# Patient Record
Sex: MALE | Race: WHITE | NOT HISPANIC OR LATINO | URBAN - METROPOLITAN AREA
[De-identification: names, ages, dates, MRNs, and addresses within clinical notes are randomized per-mention and may not be internally consistent; named-entity substitution may affect disease eponyms.]

---

## 2023-04-07 DIAGNOSIS — E03.9 HYPOTHYROIDISM, UNSPECIFIED TYPE: ICD-10-CM

## 2023-04-18 PROBLEM — E04.9 ENLARGEMENT OF THYROID: Status: ACTIVE | Noted: 2023-04-18

## 2023-04-18 PROBLEM — K21.9 GERD WITHOUT ESOPHAGITIS: Status: ACTIVE | Noted: 2023-04-18

## 2023-04-18 PROBLEM — I10 HYPERTENSION: Status: ACTIVE | Noted: 2023-04-18

## 2023-04-18 PROBLEM — E03.9 HYPOTHYROID: Status: ACTIVE | Noted: 2023-04-18

## 2023-04-18 RX ORDER — VALSARTAN AND HYDROCHLOROTHIAZIDE 320; 25 MG/1; MG/1
1 TABLET, FILM COATED ORAL DAILY
COMMUNITY
Start: 2017-09-19 | End: 2023-07-18 | Stop reason: SDUPTHER

## 2023-04-18 RX ORDER — LEVOTHYROXINE SODIUM 150 UG/1
150 TABLET ORAL
COMMUNITY
Start: 2021-03-02 | End: 2023-04-18 | Stop reason: SDUPTHER

## 2023-04-18 RX ORDER — PANTOPRAZOLE SODIUM 40 MG/1
1 TABLET, DELAYED RELEASE ORAL DAILY
COMMUNITY
Start: 2020-10-13 | End: 2023-07-18 | Stop reason: SDUPTHER

## 2023-04-18 RX ORDER — LEVOTHYROXINE SODIUM 150 UG/1
150 TABLET ORAL
Qty: 90 TABLET | Refills: 1 | Status: SHIPPED | OUTPATIENT
Start: 2023-04-18 | End: 2024-02-22 | Stop reason: SDUPTHER

## 2023-04-18 RX ORDER — AMLODIPINE BESYLATE 10 MG/1
1 TABLET ORAL DAILY
COMMUNITY
Start: 2017-10-04 | End: 2023-07-18 | Stop reason: SDUPTHER

## 2023-07-18 ENCOUNTER — OFFICE VISIT (OUTPATIENT)
Dept: PRIMARY CARE | Facility: CLINIC | Age: 53
End: 2023-07-18
Payer: COMMERCIAL

## 2023-07-18 ENCOUNTER — LAB (OUTPATIENT)
Dept: LAB | Facility: LAB | Age: 53
End: 2023-07-18
Payer: COMMERCIAL

## 2023-07-18 VITALS
HEART RATE: 75 BPM | OXYGEN SATURATION: 98 % | SYSTOLIC BLOOD PRESSURE: 120 MMHG | WEIGHT: 264 LBS | DIASTOLIC BLOOD PRESSURE: 82 MMHG | HEIGHT: 71 IN | BODY MASS INDEX: 36.96 KG/M2

## 2023-07-18 DIAGNOSIS — M79.671 PAIN IN BOTH FEET: ICD-10-CM

## 2023-07-18 DIAGNOSIS — Z12.5 SCREENING PSA (PROSTATE SPECIFIC ANTIGEN): ICD-10-CM

## 2023-07-18 DIAGNOSIS — M79.672 PAIN IN BOTH FEET: ICD-10-CM

## 2023-07-18 DIAGNOSIS — Z00.00 ANNUAL PHYSICAL EXAM: Primary | ICD-10-CM

## 2023-07-18 DIAGNOSIS — E55.9 VITAMIN D DEFICIENCY: ICD-10-CM

## 2023-07-18 DIAGNOSIS — I10 PRIMARY HYPERTENSION: ICD-10-CM

## 2023-07-18 DIAGNOSIS — E66.01 CLASS 2 SEVERE OBESITY DUE TO EXCESS CALORIES WITH SERIOUS COMORBIDITY AND BODY MASS INDEX (BMI) OF 36.0 TO 36.9 IN ADULT (MULTI): ICD-10-CM

## 2023-07-18 DIAGNOSIS — K21.9 GERD WITHOUT ESOPHAGITIS: ICD-10-CM

## 2023-07-18 DIAGNOSIS — E03.9 HYPOTHYROIDISM, UNSPECIFIED TYPE: ICD-10-CM

## 2023-07-18 DIAGNOSIS — Z00.00 ANNUAL PHYSICAL EXAM: ICD-10-CM

## 2023-07-18 LAB
ALANINE AMINOTRANSFERASE (SGPT) (U/L) IN SER/PLAS: 19 U/L (ref 10–52)
ALBUMIN (G/DL) IN SER/PLAS: 4.2 G/DL (ref 3.4–5)
ALKALINE PHOSPHATASE (U/L) IN SER/PLAS: 51 U/L (ref 33–120)
ANION GAP IN SER/PLAS: 12 MMOL/L (ref 10–20)
ASPARTATE AMINOTRANSFERASE (SGOT) (U/L) IN SER/PLAS: 21 U/L (ref 9–39)
BILIRUBIN TOTAL (MG/DL) IN SER/PLAS: 0.4 MG/DL (ref 0–1.2)
CALCIDIOL (25 OH VITAMIN D3) (NG/ML) IN SER/PLAS: 58 NG/ML
CALCIUM (MG/DL) IN SER/PLAS: 9.3 MG/DL (ref 8.6–10.6)
CARBON DIOXIDE, TOTAL (MMOL/L) IN SER/PLAS: 29 MMOL/L (ref 21–32)
CHLORIDE (MMOL/L) IN SER/PLAS: 102 MMOL/L (ref 98–107)
CHOLESTEROL (MG/DL) IN SER/PLAS: 152 MG/DL (ref 0–199)
CHOLESTEROL IN HDL (MG/DL) IN SER/PLAS: 50 MG/DL
CHOLESTEROL/HDL RATIO: 3
CREATININE (MG/DL) IN SER/PLAS: 1.04 MG/DL (ref 0.5–1.3)
ERYTHROCYTE DISTRIBUTION WIDTH (RATIO) BY AUTOMATED COUNT: 12.5 % (ref 11.5–14.5)
ERYTHROCYTE MEAN CORPUSCULAR HEMOGLOBIN CONCENTRATION (G/DL) BY AUTOMATED: 33.3 G/DL (ref 32–36)
ERYTHROCYTE MEAN CORPUSCULAR VOLUME (FL) BY AUTOMATED COUNT: 87 FL (ref 80–100)
ERYTHROCYTES (10*6/UL) IN BLOOD BY AUTOMATED COUNT: 4.84 X10E12/L (ref 4.5–5.9)
GFR MALE: 85 ML/MIN/1.73M2
GLUCOSE (MG/DL) IN SER/PLAS: 89 MG/DL (ref 74–99)
HEMATOCRIT (%) IN BLOOD BY AUTOMATED COUNT: 42.3 % (ref 41–52)
HEMOGLOBIN (G/DL) IN BLOOD: 14.1 G/DL (ref 13.5–17.5)
LDL: 80 MG/DL (ref 0–99)
LEUKOCYTES (10*3/UL) IN BLOOD BY AUTOMATED COUNT: 6.2 X10E9/L (ref 4.4–11.3)
MAGNESIUM (MG/DL) IN SER/PLAS: 2.16 MG/DL (ref 1.6–2.4)
NRBC (PER 100 WBCS) BY AUTOMATED COUNT: 0 /100 WBC (ref 0–0)
PLATELETS (10*3/UL) IN BLOOD AUTOMATED COUNT: 290 X10E9/L (ref 150–450)
POTASSIUM (MMOL/L) IN SER/PLAS: 4.3 MMOL/L (ref 3.5–5.3)
PROSTATE SPECIFIC ANTIGEN,SCREEN: 0.66 NG/ML (ref 0–4)
PROTEIN TOTAL: 7 G/DL (ref 6.4–8.2)
SODIUM (MMOL/L) IN SER/PLAS: 139 MMOL/L (ref 136–145)
THYROTROPIN (MIU/L) IN SER/PLAS BY DETECTION LIMIT <= 0.05 MIU/L: 2.24 MIU/L (ref 0.44–3.98)
TRIGLYCERIDE (MG/DL) IN SER/PLAS: 109 MG/DL (ref 0–149)
UREA NITROGEN (MG/DL) IN SER/PLAS: 20 MG/DL (ref 6–23)
VLDL: 22 MG/DL (ref 0–40)

## 2023-07-18 PROCEDURE — 99396 PREV VISIT EST AGE 40-64: CPT | Performed by: FAMILY MEDICINE

## 2023-07-18 PROCEDURE — 84443 ASSAY THYROID STIM HORMONE: CPT

## 2023-07-18 PROCEDURE — 3074F SYST BP LT 130 MM HG: CPT | Performed by: FAMILY MEDICINE

## 2023-07-18 PROCEDURE — 85027 COMPLETE CBC AUTOMATED: CPT

## 2023-07-18 PROCEDURE — 80061 LIPID PANEL: CPT

## 2023-07-18 PROCEDURE — 82306 VITAMIN D 25 HYDROXY: CPT

## 2023-07-18 PROCEDURE — 36415 COLL VENOUS BLD VENIPUNCTURE: CPT

## 2023-07-18 PROCEDURE — 3079F DIAST BP 80-89 MM HG: CPT | Performed by: FAMILY MEDICINE

## 2023-07-18 PROCEDURE — 84153 ASSAY OF PSA TOTAL: CPT

## 2023-07-18 PROCEDURE — 80053 COMPREHEN METABOLIC PANEL: CPT

## 2023-07-18 PROCEDURE — 1036F TOBACCO NON-USER: CPT | Performed by: FAMILY MEDICINE

## 2023-07-18 PROCEDURE — 3008F BODY MASS INDEX DOCD: CPT | Performed by: FAMILY MEDICINE

## 2023-07-18 PROCEDURE — 83735 ASSAY OF MAGNESIUM: CPT

## 2023-07-18 PROCEDURE — 99214 OFFICE O/P EST MOD 30 MIN: CPT | Performed by: FAMILY MEDICINE

## 2023-07-18 RX ORDER — LEVOTHYROXINE SODIUM 150 UG/1
150 TABLET ORAL DAILY
Qty: 90 TABLET | Refills: 3 | Status: SHIPPED | OUTPATIENT
Start: 2023-07-18 | End: 2024-02-22

## 2023-07-18 RX ORDER — MELOXICAM 15 MG/1
15 TABLET ORAL DAILY
Qty: 30 TABLET | Refills: 11 | Status: SHIPPED | OUTPATIENT
Start: 2023-07-18 | End: 2024-02-22 | Stop reason: ALTCHOICE

## 2023-07-18 RX ORDER — AMLODIPINE BESYLATE 10 MG/1
10 TABLET ORAL DAILY
Qty: 90 TABLET | Refills: 1 | Status: SHIPPED | OUTPATIENT
Start: 2023-07-18 | End: 2024-02-22 | Stop reason: SDUPTHER

## 2023-07-18 RX ORDER — PANTOPRAZOLE SODIUM 40 MG/1
40 TABLET, DELAYED RELEASE ORAL DAILY
Qty: 90 TABLET | Refills: 3 | Status: SHIPPED | OUTPATIENT
Start: 2023-07-18

## 2023-07-18 RX ORDER — VALSARTAN AND HYDROCHLOROTHIAZIDE 320; 25 MG/1; MG/1
1 TABLET, FILM COATED ORAL DAILY
Qty: 90 TABLET | Refills: 1 | Status: SHIPPED | OUTPATIENT
Start: 2023-07-18 | End: 2024-02-22 | Stop reason: SDUPTHER

## 2023-07-18 ASSESSMENT — PATIENT HEALTH QUESTIONNAIRE - PHQ9
SUM OF ALL RESPONSES TO PHQ QUESTIONS 1-9: 0
SUM OF ALL RESPONSES TO PHQ9 QUESTIONS 1 AND 2: 0
6. FEELING BAD ABOUT YOURSELF - OR THAT YOU ARE A FAILURE OR HAVE LET YOURSELF OR YOUR FAMILY DOWN: NOT AT ALL
8. MOVING OR SPEAKING SO SLOWLY THAT OTHER PEOPLE COULD HAVE NOTICED. OR THE OPPOSITE, BEING SO FIGETY OR RESTLESS THAT YOU HAVE BEEN MOVING AROUND A LOT MORE THAN USUAL: NOT AT ALL
7. TROUBLE CONCENTRATING ON THINGS, SUCH AS READING THE NEWSPAPER OR WATCHING TELEVISION: NOT AT ALL
1. LITTLE INTEREST OR PLEASURE IN DOING THINGS: NOT AT ALL
9. THOUGHTS THAT YOU WOULD BE BETTER OFF DEAD, OR OF HURTING YOURSELF: NOT AT ALL
10. IF YOU CHECKED OFF ANY PROBLEMS, HOW DIFFICULT HAVE THESE PROBLEMS MADE IT FOR YOU TO DO YOUR WORK, TAKE CARE OF THINGS AT HOME, OR GET ALONG WITH OTHER PEOPLE: NOT DIFFICULT AT ALL
4. FEELING TIRED OR HAVING LITTLE ENERGY: NOT AT ALL
3. TROUBLE FALLING OR STAYING ASLEEP OR SLEEPING TOO MUCH: NOT AT ALL
5. POOR APPETITE OR OVEREATING: NOT AT ALL
2. FEELING DOWN, DEPRESSED OR HOPELESS: NOT AT ALL
SUM OF ALL RESPONSES TO PHQ9 QUESTIONS 1 AND 2: 0
1. LITTLE INTEREST OR PLEASURE IN DOING THINGS: NOT AT ALL
2. FEELING DOWN, DEPRESSED OR HOPELESS: NOT AT ALL

## 2023-07-18 ASSESSMENT — PROMIS GLOBAL HEALTH SCALE
EMOTIONAL_PROBLEMS: NEVER
RATE_SOCIAL_SATISFACTION: VERY GOOD
RATE_QUALITY_OF_LIFE: GOOD
CARRYOUT_PHYSICAL_ACTIVITIES: COMPLETELY
RATE_GENERAL_HEALTH: GOOD
RATE_AVERAGE_PAIN: 1
RATE_PHYSICAL_HEALTH: GOOD
RATE_MENTAL_HEALTH: VERY GOOD
RATE_AVERAGE_FATIGUE: MILD
CARRYOUT_SOCIAL_ACTIVITIES: EXCELLENT

## 2023-07-18 ASSESSMENT — ANXIETY QUESTIONNAIRES
1. FEELING NERVOUS, ANXIOUS, OR ON EDGE: NOT AT ALL
7. FEELING AFRAID AS IF SOMETHING AWFUL MIGHT HAPPEN: NOT AT ALL
2. NOT BEING ABLE TO STOP OR CONTROL WORRYING: NOT AT ALL
5. BEING SO RESTLESS THAT IT IS HARD TO SIT STILL: NOT AT ALL
4. TROUBLE RELAXING: NOT AT ALL
IF YOU CHECKED OFF ANY PROBLEMS ON THIS QUESTIONNAIRE, HOW DIFFICULT HAVE THESE PROBLEMS MADE IT FOR YOU TO DO YOUR WORK, TAKE CARE OF THINGS AT HOME, OR GET ALONG WITH OTHER PEOPLE: NOT DIFFICULT AT ALL
6. BECOMING EASILY ANNOYED OR IRRITABLE: NOT AT ALL
GAD7 TOTAL SCORE: 0
3. WORRYING TOO MUCH ABOUT DIFFERENT THINGS: NOT AT ALL

## 2023-07-18 NOTE — PROGRESS NOTES
"Subjective   Levi Phillips is a 53 y.o. male and is here for a comprehensive physical exam with follow up on the following concerns:    HTN: BP controlled on Amlodipine and valsartan/hydrochlorothiazide  Denies CP, SOB, Edema, palpitations or headache.     HYPOTHYROID: feeling well on current dosage - did have switch to generic by pharmacy, no recheck on labs yet    FOOT PAIN: Dr. Sean Vasquez, podiatry (mentor)  No change in activity ro trauma that might account for new pain  Changed shoes which does help  Had insoles and NSAIDs in the past    Do you take any herbs or supplements that were not prescribed by a doctor? yes vitamin d  Are you taking calcium supplements? no  Are you taking aspirin daily? 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    ELIMINATION: no constipation or diarrhea    No urinary issues    SLEEP: no issues    MOOD: no concerning symptoms  Patient Health Questionnaire-9 Score: 0     LEYLA-7 Total Score: 0        Review of Systems   Review of Systems negative except as noted in HPI and Chief complaint.     Objective     /82 (BP Location: Left arm, Patient Position: Sitting, BP Cuff Size: Adult)   Pulse 75   Ht 1.803 m (5' 11\")   Wt 120 kg (264 lb)   SpO2 98%   BMI 36.82 kg/m²      Physical Exam    Assessment/Plan     Healthy male exam.      1. Please have labs drawn at your earliest convenience.  You should fast 12 hours prior to arriving at the lab - during these 12 hours you may have water, black coffee, black tea - nothing with cream or sugar and no solid foods.    Our office will contact you with results after they have been reviewed.  Please allow 48 - " 72 hours for most results, some may take longer.  Please keep in mind that results may post immediately to your online portal, even before we have a chance to review them.  Once we have had the opportunity to review we will post comments and have our staff contact you with results and any instructions.  Please call our office if you do not hear from our office in 7 days.     2. Patient Counseling:  --Nutrition: Stressed importance of moderation in sodium/caffeine intake, saturated fat and cholesterol, caloric balance, sufficient intake of fresh fruits, vegetables, fiber, calcium, iron, and 1 mg of folate supplement per day (for females capable of pregnancy).  --Discussed the issue of estrogen replacement, calcium supplement, and the daily use of baby aspirin.  --Exercise: Stressed the importance of regular exercise.   --Substance Abuse: Discussed cessation/primary prevention of tobacco, alcohol, or other drug use; driving or other dangerous activities under the influence; availability of treatment for abuse.    --Sexuality: Discussed sexually transmitted diseases, partner selection, use of condoms, avoidance of unintended pregnancy  and contraceptive alternatives.   --Injury prevention: Discussed safety belts, safety helmets, smoke detector, smoking near bedding or upholstery.   --Dental health: Discussed importance of regular tooth brushing, flossing, and dental visits.  --Immunizations reviewed.  --Discussed benefits of screening colonoscopy.  --After hours service discussed with patient  3. Discussed the patient's BMI with him.  The BMI is above average. The patient received Provided instructions on dietary changes  Advised to Increase physical activity because they have an above normal BMI.    4. Follow up in 6 months    Problem List Items Addressed This Visit       GERD without esophagitis    Relevant Medications    pantoprazole (ProtoNix) 40 mg EC tablet    Other Relevant Orders    Magnesium    Hypertension     Relevant Medications    amLODIPine (Norvasc) 10 mg tablet    valsartan-hydrochlorothiazide (Diovan-HCT) 320-25 mg tablet    Other Relevant Orders    Vitamin D, Total    Hypothyroid    Relevant Medications    Synthroid 150 mcg tablet    Annual physical exam - Primary    Relevant Orders    CBC    Comprehensive Metabolic Panel    Lipid Panel    TSH with reflex to Free T4 if abnormal     Other Visit Diagnoses       Screening PSA (prostate specific antigen)        Relevant Orders    Prostate Specific Antigen, Screen    Vitamin D deficiency        Pain in both feet        Trial of Meloxicam, ICE, rest if possible  Consider recheck with podiatry for custom orthotics vs.  xrays if needed.  Consider trial of Prednisone taper.    Relevant Medications    meloxicam (Mobic) 15 mg tablet    Class 2 severe obesity due to excess calories with serious comorbidity and body mass index (BMI) of 36.0 to 36.9 in adult (CMS/[facility])        Encouraging increased acitivty - goal is 150 minutes of CV activity per week.         FOLLOW UP 6 MONTHS FOR BP CHECK PENDING REVIEW OF LABS.

## 2024-02-22 ENCOUNTER — OFFICE VISIT (OUTPATIENT)
Dept: PRIMARY CARE | Facility: CLINIC | Age: 54
End: 2024-02-22
Payer: COMMERCIAL

## 2024-02-22 VITALS
SYSTOLIC BLOOD PRESSURE: 144 MMHG | WEIGHT: 275 LBS | OXYGEN SATURATION: 94 % | DIASTOLIC BLOOD PRESSURE: 80 MMHG | HEART RATE: 66 BPM | BODY MASS INDEX: 38.35 KG/M2

## 2024-02-22 DIAGNOSIS — I10 PRIMARY HYPERTENSION: Primary | ICD-10-CM

## 2024-02-22 DIAGNOSIS — Z12.5 SCREENING PSA (PROSTATE SPECIFIC ANTIGEN): ICD-10-CM

## 2024-02-22 DIAGNOSIS — Z00.00 ANNUAL PHYSICAL EXAM: ICD-10-CM

## 2024-02-22 DIAGNOSIS — E03.9 HYPOTHYROIDISM, UNSPECIFIED TYPE: ICD-10-CM

## 2024-02-22 PROCEDURE — 99213 OFFICE O/P EST LOW 20 MIN: CPT | Performed by: FAMILY MEDICINE

## 2024-02-22 PROCEDURE — 3077F SYST BP >= 140 MM HG: CPT | Performed by: FAMILY MEDICINE

## 2024-02-22 PROCEDURE — 3079F DIAST BP 80-89 MM HG: CPT | Performed by: FAMILY MEDICINE

## 2024-02-22 PROCEDURE — 1036F TOBACCO NON-USER: CPT | Performed by: FAMILY MEDICINE

## 2024-02-22 PROCEDURE — 3008F BODY MASS INDEX DOCD: CPT | Performed by: FAMILY MEDICINE

## 2024-02-22 RX ORDER — LEVOTHYROXINE SODIUM 150 UG/1
150 TABLET ORAL
Qty: 100 TABLET | Refills: 1 | Status: SHIPPED | OUTPATIENT
Start: 2024-02-22 | End: 2024-03-21 | Stop reason: SDUPTHER

## 2024-02-22 RX ORDER — VALSARTAN AND HYDROCHLOROTHIAZIDE 320; 25 MG/1; MG/1
1 TABLET, FILM COATED ORAL DAILY
Qty: 100 TABLET | Refills: 1 | Status: SHIPPED | OUTPATIENT
Start: 2024-02-22 | End: 2024-03-21 | Stop reason: SDUPTHER

## 2024-02-22 RX ORDER — AMLODIPINE BESYLATE 10 MG/1
10 TABLET ORAL DAILY
Qty: 100 TABLET | Refills: 1 | Status: SHIPPED | OUTPATIENT
Start: 2024-02-22 | End: 2024-02-22 | Stop reason: SDUPTHER

## 2024-02-22 RX ORDER — VALSARTAN AND HYDROCHLOROTHIAZIDE 320; 25 MG/1; MG/1
1 TABLET, FILM COATED ORAL DAILY
Qty: 30 TABLET | Refills: 1 | Status: SHIPPED | OUTPATIENT
Start: 2024-02-22

## 2024-02-22 RX ORDER — AMLODIPINE BESYLATE 10 MG/1
10 TABLET ORAL DAILY
Qty: 30 TABLET | Refills: 1 | Status: SHIPPED | OUTPATIENT
Start: 2024-02-22

## 2024-02-22 RX ORDER — VALSARTAN AND HYDROCHLOROTHIAZIDE 320; 25 MG/1; MG/1
1 TABLET, FILM COATED ORAL DAILY
Qty: 100 TABLET | Refills: 1 | Status: SHIPPED | OUTPATIENT
Start: 2024-02-22 | End: 2024-02-22 | Stop reason: SDUPTHER

## 2024-02-22 RX ORDER — AMLODIPINE BESYLATE 10 MG/1
10 TABLET ORAL DAILY
Qty: 100 TABLET | Refills: 1 | Status: SHIPPED | OUTPATIENT
Start: 2024-02-22 | End: 2024-03-21 | Stop reason: SDUPTHER

## 2024-02-22 ASSESSMENT — PAIN SCALES - GENERAL: PAINLEVEL: 0-NO PAIN

## 2024-02-22 NOTE — PROGRESS NOTES
Subjective   Patient ID: Levi Phillips is a 54 y.o. male who presents for Med Refill.    HPI    HTN: BP elevated today  He has been out of medication for several days.  Denies CP, SOB, Edema, palpitations or headache.     HYPOTHYROID: feeling well on current thyroid replacement but he has noticed that the cost has gone up significantly on brand Synthroid.  Had been on generic Levothyroxine in the past and would like to try going back on generic due to cost.    Review of Systems    Review of Systems negative except as noted in HPI and Chief complaint.     Objective                 /80   Pulse 66   Wt 125 kg (275 lb)   SpO2 94%   BMI 38.35 kg/m²      Physical Exam  Constitutional:       General: He is not in acute distress.     Appearance: Normal appearance. He is not ill-appearing.   HENT:      Head: Normocephalic and atraumatic.   Neck:      Vascular: No carotid bruit.   Cardiovascular:      Rate and Rhythm: Normal rate and regular rhythm.      Pulses: Normal pulses.      Heart sounds: Normal heart sounds. No murmur heard.     No gallop.   Pulmonary:      Effort: Pulmonary effort is normal.      Breath sounds: Normal breath sounds. No wheezing, rhonchi or rales.   Musculoskeletal:      Cervical back: Normal range of motion and neck supple. No rigidity or tenderness.   Lymphadenopathy:      Cervical: No cervical adenopathy.   Skin:     General: Skin is warm and dry.   Neurological:      Mental Status: He is alert.   Psychiatric:         Mood and Affect: Mood normal.         Behavior: Behavior normal.         Assessment/Plan   Problem List Items Addressed This Visit       Hypertension    Relevant Medications    amLODIPine (Norvasc) 10 mg tablet    valsartan-hydrochlorothiazide (Diovan-HCT) 320-25 mg tablet    valsartan-hydrochlorothiazide (Diovan-HCT) 320-25 mg tablet    amLODIPine (Norvasc) 10 mg tablet    Hypothyroid    Relevant Medications    levothyroxine (Synthroid, Levoxyl) 150 mcg tablet     Monitor  BP at home - please update use with your home readings or stop in for BP check in the next few weeks after resuming your medications.    FOLLOW UP 6 MONTHS FOR CPE AND FASTING LABS IF BP CONTROLLED.

## 2024-03-15 DIAGNOSIS — I10 PRIMARY HYPERTENSION: ICD-10-CM

## 2024-03-15 DIAGNOSIS — E03.9 HYPOTHYROIDISM, UNSPECIFIED TYPE: ICD-10-CM

## 2024-03-19 RX ORDER — VALSARTAN AND HYDROCHLOROTHIAZIDE 320; 25 MG/1; MG/1
1 TABLET, FILM COATED ORAL DAILY
Qty: 90 TABLET | Refills: 1 | OUTPATIENT
Start: 2024-03-19

## 2024-03-19 RX ORDER — AMLODIPINE BESYLATE 10 MG/1
10 TABLET ORAL DAILY
Qty: 90 TABLET | Refills: 1 | OUTPATIENT
Start: 2024-03-19

## 2024-03-19 NOTE — TELEPHONE ENCOUNTER
Patient states that [facility] never got his medications from [facility], it looks like it was duplicated in our system so I am not sure if they got canceled out or not, but he is almost out of his medications. Pended for resend.

## 2024-03-21 RX ORDER — LEVOTHYROXINE SODIUM 150 UG/1
150 TABLET ORAL
Qty: 100 TABLET | Refills: 1 | Status: SHIPPED | OUTPATIENT
Start: 2024-03-21

## 2024-03-21 RX ORDER — VALSARTAN AND HYDROCHLOROTHIAZIDE 320; 25 MG/1; MG/1
1 TABLET, FILM COATED ORAL DAILY
Qty: 100 TABLET | Refills: 1 | Status: SHIPPED | OUTPATIENT
Start: 2024-03-21

## 2024-03-21 RX ORDER — AMLODIPINE BESYLATE 10 MG/1
10 TABLET ORAL DAILY
Qty: 100 TABLET | Refills: 1 | Status: SHIPPED | OUTPATIENT
Start: 2024-03-21

## 2024-06-29 DIAGNOSIS — K21.9 GERD WITHOUT ESOPHAGITIS: ICD-10-CM

## 2024-07-16 RX ORDER — PANTOPRAZOLE SODIUM 40 MG/1
40 TABLET, DELAYED RELEASE ORAL DAILY
Qty: 90 TABLET | Refills: 3 | Status: SHIPPED | OUTPATIENT
Start: 2024-07-16

## 2024-09-05 ENCOUNTER — TELEPHONE (OUTPATIENT)
Dept: PRIMARY CARE | Facility: CLINIC | Age: 54
End: 2024-09-05
Payer: COMMERCIAL

## 2024-09-05 DIAGNOSIS — E03.9 HYPOTHYROIDISM, UNSPECIFIED TYPE: ICD-10-CM

## 2024-09-05 DIAGNOSIS — I10 PRIMARY HYPERTENSION: ICD-10-CM

## 2024-09-05 DIAGNOSIS — K21.9 GERD WITHOUT ESOPHAGITIS: ICD-10-CM

## 2024-09-05 RX ORDER — PANTOPRAZOLE SODIUM 40 MG/1
40 TABLET, DELAYED RELEASE ORAL DAILY
Qty: 90 TABLET | Refills: 0 | Status: SHIPPED | OUTPATIENT
Start: 2024-09-05

## 2024-09-05 RX ORDER — AMLODIPINE BESYLATE 10 MG/1
10 TABLET ORAL DAILY
Qty: 90 TABLET | Refills: 0 | Status: SHIPPED | OUTPATIENT
Start: 2024-09-05

## 2024-09-05 RX ORDER — LEVOTHYROXINE SODIUM 150 UG/1
150 TABLET ORAL
Qty: 90 TABLET | Refills: 0 | Status: SHIPPED | OUTPATIENT
Start: 2024-09-05

## 2024-09-05 NOTE — TELEPHONE ENCOUNTER
Medication Name: Amlodipine   Dose: 10 mg tablet  Frequency: Take 1 tablet by mouth once daily      Medication Name: Levothyroxine  Dose: 150 mcg tablet   Frequency: Take 1 tablet by mouth once daily in the morning    Medication Name: Pantoprazole   Dose: 40 mg EC tablet   Frequency: Take 1 tablet once daily       Medication Name: Valsartan-hydrochlorothiazide   Dose: 320-25 mg tablet   Frequency: Take 1 tablet by mouth once daily  Pharmacy: [facility] left: 1 week   Last appointment: 2/22/24  Last CPE:  Last MCW:  Next appointment: 10/21/24  Next CPE:  Next MCW:

## 2024-10-21 ENCOUNTER — APPOINTMENT (OUTPATIENT)
Dept: PRIMARY CARE | Facility: CLINIC | Age: 54
End: 2024-10-21
Payer: COMMERCIAL

## 2024-10-21 VITALS
HEART RATE: 77 BPM | BODY MASS INDEX: 37.66 KG/M2 | OXYGEN SATURATION: 98 % | WEIGHT: 269 LBS | DIASTOLIC BLOOD PRESSURE: 80 MMHG | HEIGHT: 71 IN | SYSTOLIC BLOOD PRESSURE: 126 MMHG

## 2024-10-21 DIAGNOSIS — I10 PRIMARY HYPERTENSION: ICD-10-CM

## 2024-10-21 DIAGNOSIS — K21.9 GERD WITHOUT ESOPHAGITIS: ICD-10-CM

## 2024-10-21 DIAGNOSIS — E03.9 HYPOTHYROIDISM, UNSPECIFIED TYPE: ICD-10-CM

## 2024-10-21 PROCEDURE — 3079F DIAST BP 80-89 MM HG: CPT | Performed by: FAMILY MEDICINE

## 2024-10-21 PROCEDURE — 99396 PREV VISIT EST AGE 40-64: CPT | Performed by: FAMILY MEDICINE

## 2024-10-21 PROCEDURE — 3008F BODY MASS INDEX DOCD: CPT | Performed by: FAMILY MEDICINE

## 2024-10-21 PROCEDURE — 99213 OFFICE O/P EST LOW 20 MIN: CPT | Performed by: FAMILY MEDICINE

## 2024-10-21 PROCEDURE — 3074F SYST BP LT 130 MM HG: CPT | Performed by: FAMILY MEDICINE

## 2024-10-21 RX ORDER — PANTOPRAZOLE SODIUM 40 MG/1
40 TABLET, DELAYED RELEASE ORAL DAILY
Qty: 90 TABLET | Refills: 1 | Status: SHIPPED | OUTPATIENT
Start: 2024-10-21

## 2024-10-21 RX ORDER — LEVOTHYROXINE SODIUM 150 UG/1
150 TABLET ORAL
Qty: 90 TABLET | Refills: 1 | Status: SHIPPED | OUTPATIENT
Start: 2024-10-21

## 2024-10-21 RX ORDER — VALSARTAN AND HYDROCHLOROTHIAZIDE 320; 25 MG/1; MG/1
1 TABLET, FILM COATED ORAL DAILY
Qty: 90 TABLET | Refills: 1 | Status: SHIPPED | OUTPATIENT
Start: 2024-10-21

## 2024-10-21 RX ORDER — AMLODIPINE BESYLATE 10 MG/1
10 TABLET ORAL DAILY
Qty: 90 TABLET | Refills: 1 | Status: SHIPPED | OUTPATIENT
Start: 2024-10-21

## 2024-10-21 ASSESSMENT — PROMIS GLOBAL HEALTH SCALE
RATE_AVERAGE_PAIN: 0
RATE_MENTAL_HEALTH: EXCELLENT
RATE_QUALITY_OF_LIFE: EXCELLENT
RATE_GENERAL_HEALTH: EXCELLENT
CARRYOUT_PHYSICAL_ACTIVITIES: COMPLETELY
CARRYOUT_SOCIAL_ACTIVITIES: EXCELLENT
EMOTIONAL_PROBLEMS: NEVER
RATE_PHYSICAL_HEALTH: EXCELLENT
RATE_SOCIAL_SATISFACTION: EXCELLENT

## 2024-10-21 ASSESSMENT — LIFESTYLE VARIABLES
HOW OFTEN DO YOU HAVE A DRINK CONTAINING ALCOHOL: 2-3 TIMES A WEEK
HOW OFTEN DO YOU HAVE SIX OR MORE DRINKS ON ONE OCCASION: NEVER
SKIP TO QUESTIONS 9-10: 1
AUDIT-C TOTAL SCORE: 3
HOW MANY STANDARD DRINKS CONTAINING ALCOHOL DO YOU HAVE ON A TYPICAL DAY: 1 OR 2

## 2024-10-21 NOTE — ASSESSMENT & PLAN NOTE
Orders:    pantoprazole (ProtoNix) 40 mg EC tablet; Take 1 tablet (40 mg) by mouth once daily.

## 2024-10-21 NOTE — ASSESSMENT & PLAN NOTE
Orders:    levothyroxine (Synthroid, Levoxyl) 150 mcg tablet; Take 1 tablet (150 mcg) by mouth once daily in the morning. Take before meals.

## 2024-10-21 NOTE — ASSESSMENT & PLAN NOTE
Orders:    amLODIPine (Norvasc) 10 mg tablet; Take 1 tablet (10 mg) by mouth once daily.    valsartan-hydrochlorothiazide (Diovan-HCT) 320-25 mg tablet; Take 1 tablet by mouth once daily.

## 2024-10-21 NOTE — PROGRESS NOTES
Subjective   Levi Phillips is a 54 y.o. male and is here for a comprehensive physical exam. The patient reports no problems.    HTN:  BP controlled today  Denies CP, SOB, Edema, palpitations or headache.     HYPOTHYROID:  feling well on current Levothyroxine dosage    Do you take any herbs or supplements that were not prescribed by a doctor? no  Are you taking calcium supplements? no  Are you taking aspirin daily? no    SOC Hx:   Tobacco Use: Medium Risk (10/21/2024)    Patient History     Smoking Tobacco Use: Never     Smokeless Tobacco Use: Former     Passive Exposure: Not on file     Alcohol Use: Not At Risk (10/21/2024)    AUDIT-C     Frequency of Alcohol Consumption: 2-3 times a week     Average Number of Drinks: 1 or 2     Frequency of Binge Drinking: Never     DIET: general    EXERCISE:  irregularly    ELIMINATION: no constipation or diarrhea  COLON CA SCREENING: COLONOSCOPY 1/18/2019 REPEAT DUE 10 years    URINARY SX: none  PSA   Date Value Ref Range Status   01/28/2021 0.54 0.00 - 4.00 ng/mL Final     Comment:     The FDA requires that the method used for PSA assay be   reported to the physician. Values obtained with different   assay methods must not be used interchangeably. This test   was performed at [facility] using the Siemens  [employer] PSA method, which is a sandwich immunoassay using   chemiluminescence for quantitation. The assay is approved  for measurement of prostate-specific antigen (PSA) in   serum and may be used in conjunction with a digital rectal  examination in men 50 years and older as an aid in   detection of prostate cancer.   5-Alpha-reductase inhibitors (e.g. Proscar, Finasteride,   Avodart, Dutasteride and Irena) for the treatment of BPH   have been shown to lower PSA levels by an average of 50%   after 6 months of treatment.         SLEEP:  no issues    MOODS: stress manageable  Patient Health Questionnaire-9 Score: 0  LEYLA-7 Total Score: 0       Health  "Maintenance Due   Topic Date Due    HIV Screening  Never done    MMR Vaccine (1 of 1 - Standard series) Never done    Hepatitis C Screening  Never done    Hepatitis B Vaccine (1 of 3 - 19+ 3-dose series) Never done    Influenza Vaccine (1) Never done        Objective     VITALS:  /80 (BP Location: Left arm, Patient Position: Sitting, BP Cuff Size: Adult)   Pulse 77   Ht 1.803 m (5' 11\")   Wt 122 kg (269 lb)   SpO2 98%   BMI 37.52 kg/m²      Physical Exam  Constitutional:       General: He is not in acute distress.     Appearance: Normal appearance.   HENT:      Head: Normocephalic and atraumatic.      Right Ear: Tympanic membrane, ear canal and external ear normal.      Left Ear: Tympanic membrane, ear canal and external ear normal.      Nose: Nose normal.      Mouth/Throat:      Mouth: Mucous membranes are moist.      Pharynx: No oropharyngeal exudate or posterior oropharyngeal erythema.   Eyes:      Extraocular Movements: Extraocular movements intact.      Conjunctiva/sclera: Conjunctivae normal.      Pupils: Pupils are equal, round, and reactive to light.   Cardiovascular:      Rate and Rhythm: Normal rate and regular rhythm.      Heart sounds: No murmur heard.  Pulmonary:      Effort: Pulmonary effort is normal.      Breath sounds: Normal breath sounds.   Abdominal:      General: Bowel sounds are normal.      Palpations: Abdomen is soft.   Musculoskeletal:         General: Normal range of motion.      Cervical back: No rigidity.   Lymphadenopathy:      Cervical: No cervical adenopathy.   Skin:     General: Skin is warm and dry.      Findings: No rash.   Neurological:      General: No focal deficit present.      Mental Status: He is alert and oriented to person, place, and time.      Cranial Nerves: No cranial nerve deficit.      Gait: Gait normal.   Psychiatric:         Mood and Affect: Mood normal.         Behavior: Behavior normal.         Assessment/Plan   Healthy male exam.      1. Please have " labs drawn at your earliest convenience.  You should fast 12 hours prior to arriving at the lab - during these 12 hours you may have water, black coffee, black tea - nothing with cream or sugar and no solid foods.    Our office will contact you with results after they have been reviewed.  Please allow 48 - 72 hours for most results, some may take longer.  Please keep in mind that results may post immediately to your online portal, even before we have a chance to review them.  Once we have had the opportunity to review we will post comments and have our staff contact you with results and any instructions.  Please call our office if you do not hear from our office in 7 days.     2. Patient Counseling:  --Nutrition: Stressed importance of moderation in sodium/caffeine intake, saturated fat and cholesterol, caloric balance, sufficient intake of fresh fruits, vegetables, fiber, calcium, iron, and 1 mg of folate supplement per day (for females capable of pregnancy).  --Exercise: Stressed the importance of regular exercise.   --Immunizations reviewed.  --Discussed benefits of screening colonoscopy(patients > 45 years of age)    3. Discussed the patient's BMI with him.  The BMI is above average. The patient received Current weight: 122 kg (269 lb)  Weight change since last visit (-) denotes wt loss -6 lbs   Weight loss needed to achieve BMI 25: 90.1 Lbs  Weight loss needed to achieve BMI 30: 54.4 Lbs because they have an above normal BMI.  BMI was above normal measurement. Current weight: 122 kg (269 lb)  Weight change since last visit (-) denotes wt loss -6 lbs   Weight loss needed to achieve BMI 25: 90.1 Lbs  Weight loss needed to achieve BMI 30: 54.4 Lbs  Provided instructions on dietary changes  Provided instructions on exercise  Advised to Increase physical activity      Assessment & Plan  Primary hypertension    Orders:    amLODIPine (Norvasc) 10 mg tablet; Take 1 tablet (10 mg) by mouth once daily.     valsartan-hydrochlorothiazide (Diovan-HCT) 320-25 mg tablet; Take 1 tablet by mouth once daily.    Hypothyroidism, unspecified type    Orders:    levothyroxine (Synthroid, Levoxyl) 150 mcg tablet; Take 1 tablet (150 mcg) by mouth once daily in the morning. Take before meals.    GERD without esophagitis    Orders:    pantoprazole (ProtoNix) 40 mg EC tablet; Take 1 tablet (40 mg) by mouth once daily.         Follow up in 6 months

## 2024-10-22 ASSESSMENT — ANXIETY QUESTIONNAIRES
3. WORRYING TOO MUCH ABOUT DIFFERENT THINGS: NOT AT ALL
IF YOU CHECKED OFF ANY PROBLEMS ON THIS QUESTIONNAIRE, HOW DIFFICULT HAVE THESE PROBLEMS MADE IT FOR YOU TO DO YOUR WORK, TAKE CARE OF THINGS AT HOME, OR GET ALONG WITH OTHER PEOPLE: NOT DIFFICULT AT ALL
6. BECOMING EASILY ANNOYED OR IRRITABLE: NOT AT ALL
2. NOT BEING ABLE TO STOP OR CONTROL WORRYING: NOT AT ALL
1. FEELING NERVOUS, ANXIOUS, OR ON EDGE: NOT AT ALL
5. BEING SO RESTLESS THAT IT IS HARD TO SIT STILL: NOT AT ALL
GAD7 TOTAL SCORE: 0
7. FEELING AFRAID AS IF SOMETHING AWFUL MIGHT HAPPEN: NOT AT ALL
4. TROUBLE RELAXING: NOT AT ALL

## 2024-10-22 ASSESSMENT — PATIENT HEALTH QUESTIONNAIRE - PHQ9
7. TROUBLE CONCENTRATING ON THINGS, SUCH AS READING THE NEWSPAPER OR WATCHING TELEVISION: NOT AT ALL
2. FEELING DOWN, DEPRESSED OR HOPELESS: NOT AT ALL
4. FEELING TIRED OR HAVING LITTLE ENERGY: NOT AT ALL
10. IF YOU CHECKED OFF ANY PROBLEMS, HOW DIFFICULT HAVE THESE PROBLEMS MADE IT FOR YOU TO DO YOUR WORK, TAKE CARE OF THINGS AT HOME, OR GET ALONG WITH OTHER PEOPLE: NOT DIFFICULT AT ALL
9. THOUGHTS THAT YOU WOULD BE BETTER OFF DEAD, OR OF HURTING YOURSELF: NOT AT ALL
6. FEELING BAD ABOUT YOURSELF - OR THAT YOU ARE A FAILURE OR HAVE LET YOURSELF OR YOUR FAMILY DOWN: NOT AT ALL
1. LITTLE INTEREST OR PLEASURE IN DOING THINGS: NOT AT ALL
SUM OF ALL RESPONSES TO PHQ9 QUESTIONS 1 AND 2: 0
5. POOR APPETITE OR OVEREATING: NOT AT ALL
3. TROUBLE FALLING OR STAYING ASLEEP OR SLEEPING TOO MUCH: NOT AT ALL
SUM OF ALL RESPONSES TO PHQ QUESTIONS 1-9: 0
8. MOVING OR SPEAKING SO SLOWLY THAT OTHER PEOPLE COULD HAVE NOTICED. OR THE OPPOSITE, BEING SO FIGETY OR RESTLESS THAT YOU HAVE BEEN MOVING AROUND A LOT MORE THAN USUAL: NOT AT ALL

## 2025-03-14 DIAGNOSIS — I10 PRIMARY HYPERTENSION: ICD-10-CM

## 2025-03-17 RX ORDER — AMLODIPINE BESYLATE 10 MG/1
10 TABLET ORAL DAILY
Qty: 30 TABLET | Refills: 0 | Status: SHIPPED | OUTPATIENT
Start: 2025-03-17

## 2025-03-17 NOTE — TELEPHONE ENCOUNTER
Follow-up visit: none - due April  Last visit: 10/21/24 rtc in 6 months  Last vitals: 10/21/24 (/80, HR 77)    Refill request from [facility] mail order pharmacy for the following:   - amLODIPine 10mg daily   Approved: 30 day supply because pt needs to schedule his 6 month follow-up for further refills

## 2025-03-30 DIAGNOSIS — K21.9 GERD WITHOUT ESOPHAGITIS: ICD-10-CM

## 2025-04-02 RX ORDER — PANTOPRAZOLE SODIUM 40 MG/1
40 TABLET, DELAYED RELEASE ORAL DAILY
Qty: 90 TABLET | Refills: 0 | Status: SHIPPED | OUTPATIENT
Start: 2025-04-02

## 2025-04-03 NOTE — TELEPHONE ENCOUNTER
Follow-up visit: none - due April  Last visit: 10/21/24 rtc in 6m  Last labs: Mg - none - recommended    Refill request from [facility] pharmacy for the following:   - pantoprazole 40mg daily  Approved: 90 days  Pt needs to schedule his 6 month follow-up for further refills

## 2025-04-10 DIAGNOSIS — E03.9 HYPOTHYROIDISM, UNSPECIFIED TYPE: ICD-10-CM

## 2025-04-10 DIAGNOSIS — I10 PRIMARY HYPERTENSION: ICD-10-CM

## 2025-04-13 DIAGNOSIS — I10 PRIMARY HYPERTENSION: ICD-10-CM

## 2025-04-15 RX ORDER — LEVOTHYROXINE SODIUM 150 UG/1
150 TABLET ORAL EVERY MORNING
Qty: 30 TABLET | Refills: 0 | Status: SHIPPED | OUTPATIENT
Start: 2025-04-15

## 2025-04-15 RX ORDER — VALSARTAN AND HYDROCHLOROTHIAZIDE 320; 25 MG/1; MG/1
1 TABLET, FILM COATED ORAL DAILY
Qty: 30 TABLET | Refills: 0 | Status: SHIPPED | OUTPATIENT
Start: 2025-04-15

## 2025-04-15 RX ORDER — AMLODIPINE BESYLATE 10 MG/1
10 TABLET ORAL DAILY
Qty: 30 TABLET | Refills: 0 | Status: SHIPPED | OUTPATIENT
Start: 2025-04-15

## 2025-04-16 NOTE — TELEPHONE ENCOUNTER
Follow-up visit: none - due April  Last visit: 10/21/24 rtc in 6m  Last labs: 7/18/23 (TSH 2.24, Na 139, K 4.3, Ca 9.3, Cr 1.04, eGFR 85)  Last vitals: 10/21/24 (/80, HR 77)    Refill request from [facility] pharmacy for the following:   - valsartan-hydrochlorothiazide 320-25 mg  - Synthroid 150 mcg daily  Approved: 30 days  Pt needs to schedule his 6 month follow-up for further refills

## 2025-04-16 NOTE — TELEPHONE ENCOUNTER
Follow-up visit: none - due April  Last visit: 10/21/24 rtc in 6m  Last labs: 7/18/23 (TSH 2.24, Na 139, K 4.3, Ca 9.3, Cr 1.04, eGFR 85)  Last vitals: 10/21/24 (/80, HR 77)    Refill request from [facility] pharmacy for the following:   - valsartan-hydrochlorothiazide 320-25 mg  - Synthroid 150 mcg daily  Approved: 30 days  Pt needs to schedule his 6 month follow-up for further refills

## 2025-05-13 ENCOUNTER — APPOINTMENT (OUTPATIENT)
Dept: PRIMARY CARE | Facility: CLINIC | Age: 55
End: 2025-05-13
Payer: COMMERCIAL

## 2025-05-13 VITALS
WEIGHT: 268 LBS | HEIGHT: 73 IN | BODY MASS INDEX: 35.52 KG/M2 | HEART RATE: 77 BPM | OXYGEN SATURATION: 96 % | DIASTOLIC BLOOD PRESSURE: 78 MMHG | SYSTOLIC BLOOD PRESSURE: 129 MMHG | TEMPERATURE: 98 F

## 2025-05-13 DIAGNOSIS — K21.9 GERD WITHOUT ESOPHAGITIS: ICD-10-CM

## 2025-05-13 DIAGNOSIS — Z13.29 THYROID DISORDER SCREENING: ICD-10-CM

## 2025-05-13 DIAGNOSIS — I10 PRIMARY HYPERTENSION: ICD-10-CM

## 2025-05-13 DIAGNOSIS — Z13.6 SCREENING FOR CARDIOVASCULAR CONDITION: ICD-10-CM

## 2025-05-13 DIAGNOSIS — Z12.5 SCREENING FOR MALIGNANT NEOPLASM OF PROSTATE: ICD-10-CM

## 2025-05-13 DIAGNOSIS — G89.29 CHRONIC LEFT SHOULDER PAIN: ICD-10-CM

## 2025-05-13 DIAGNOSIS — E55.9 VITAMIN D DEFICIENCY: ICD-10-CM

## 2025-05-13 DIAGNOSIS — M25.512 CHRONIC LEFT SHOULDER PAIN: ICD-10-CM

## 2025-05-13 DIAGNOSIS — E03.9 ACQUIRED HYPOTHYROIDISM: ICD-10-CM

## 2025-05-13 DIAGNOSIS — M25.512 ACUTE PAIN OF LEFT SHOULDER: ICD-10-CM

## 2025-05-13 DIAGNOSIS — Z00.00 ANNUAL PHYSICAL EXAM: Primary | ICD-10-CM

## 2025-05-13 DIAGNOSIS — Z13.0 SCREENING FOR DISORDER OF BLOOD AND BLOOD-FORMING ORGANS: ICD-10-CM

## 2025-05-13 RX ORDER — LEVOTHYROXINE SODIUM 150 UG/1
150 TABLET ORAL EVERY MORNING
Qty: 90 TABLET | Refills: 1 | Status: SHIPPED | OUTPATIENT
Start: 2025-05-13 | End: 2025-05-31

## 2025-05-13 RX ORDER — PREDNISONE 20 MG/1
TABLET ORAL
Qty: 9 TABLET | Refills: 0 | Status: SHIPPED | OUTPATIENT
Start: 2025-05-13 | End: 2025-05-13 | Stop reason: SDUPTHER

## 2025-05-13 RX ORDER — AMLODIPINE BESYLATE 10 MG/1
10 TABLET ORAL DAILY
Qty: 90 TABLET | Refills: 1 | Status: SHIPPED | OUTPATIENT
Start: 2025-05-13

## 2025-05-13 RX ORDER — VALSARTAN AND HYDROCHLOROTHIAZIDE 320; 25 MG/1; MG/1
1 TABLET, FILM COATED ORAL DAILY
Qty: 90 TABLET | Refills: 1 | Status: SHIPPED | OUTPATIENT
Start: 2025-05-13

## 2025-05-13 RX ORDER — CELECOXIB 200 MG/1
200 CAPSULE ORAL DAILY
Qty: 30 CAPSULE | Refills: 5 | Status: SHIPPED | OUTPATIENT
Start: 2025-05-13

## 2025-05-13 RX ORDER — PANTOPRAZOLE SODIUM 40 MG/1
40 TABLET, DELAYED RELEASE ORAL DAILY
Qty: 90 TABLET | Refills: 3 | Status: SHIPPED | OUTPATIENT
Start: 2025-05-13

## 2025-05-13 RX ORDER — CELECOXIB 200 MG/1
200 CAPSULE ORAL DAILY
Qty: 30 CAPSULE | Refills: 5 | Status: SHIPPED | OUTPATIENT
Start: 2025-05-13 | End: 2025-05-13 | Stop reason: SDUPTHER

## 2025-05-13 RX ORDER — PREDNISONE 20 MG/1
TABLET ORAL
Qty: 9 TABLET | Refills: 0 | Status: SHIPPED | OUTPATIENT
Start: 2025-05-13 | End: 2025-05-19

## 2025-05-13 ASSESSMENT — ANXIETY QUESTIONNAIRES
5. BEING SO RESTLESS THAT IT IS HARD TO SIT STILL: NOT AT ALL
6. BECOMING EASILY ANNOYED OR IRRITABLE: NOT AT ALL
3. WORRYING TOO MUCH ABOUT DIFFERENT THINGS: NOT AT ALL
IF YOU CHECKED OFF ANY PROBLEMS ON THIS QUESTIONNAIRE, HOW DIFFICULT HAVE THESE PROBLEMS MADE IT FOR YOU TO DO YOUR WORK, TAKE CARE OF THINGS AT HOME, OR GET ALONG WITH OTHER PEOPLE: NOT DIFFICULT AT ALL
GAD7 TOTAL SCORE: 0
1. FEELING NERVOUS, ANXIOUS, OR ON EDGE: NOT AT ALL
4. TROUBLE RELAXING: NOT AT ALL
7. FEELING AFRAID AS IF SOMETHING AWFUL MIGHT HAPPEN: NOT AT ALL
2. NOT BEING ABLE TO STOP OR CONTROL WORRYING: NOT AT ALL

## 2025-05-13 ASSESSMENT — PATIENT HEALTH QUESTIONNAIRE - PHQ9
7. TROUBLE CONCENTRATING ON THINGS, SUCH AS READING THE NEWSPAPER OR WATCHING TELEVISION: NOT AT ALL
9. THOUGHTS THAT YOU WOULD BE BETTER OFF DEAD, OR OF HURTING YOURSELF: NOT AT ALL
5. POOR APPETITE OR OVEREATING: NOT AT ALL
SUM OF ALL RESPONSES TO PHQ9 QUESTIONS 1 AND 2: 0
8. MOVING OR SPEAKING SO SLOWLY THAT OTHER PEOPLE COULD HAVE NOTICED. OR THE OPPOSITE, BEING SO FIGETY OR RESTLESS THAT YOU HAVE BEEN MOVING AROUND A LOT MORE THAN USUAL: NOT AT ALL
1. LITTLE INTEREST OR PLEASURE IN DOING THINGS: NOT AT ALL
3. TROUBLE FALLING OR STAYING ASLEEP OR SLEEPING TOO MUCH: SEVERAL DAYS
2. FEELING DOWN, DEPRESSED OR HOPELESS: NOT AT ALL
10. IF YOU CHECKED OFF ANY PROBLEMS, HOW DIFFICULT HAVE THESE PROBLEMS MADE IT FOR YOU TO DO YOUR WORK, TAKE CARE OF THINGS AT HOME, OR GET ALONG WITH OTHER PEOPLE: NOT DIFFICULT AT ALL
4. FEELING TIRED OR HAVING LITTLE ENERGY: NOT AT ALL
6. FEELING BAD ABOUT YOURSELF - OR THAT YOU ARE A FAILURE OR HAVE LET YOURSELF OR YOUR FAMILY DOWN: NOT AT ALL
SUM OF ALL RESPONSES TO PHQ QUESTIONS 1-9: 1

## 2025-05-13 ASSESSMENT — PROMIS GLOBAL HEALTH SCALE
CARRYOUT_SOCIAL_ACTIVITIES: EXCELLENT
RATE_MENTAL_HEALTH: EXCELLENT
RATE_AVERAGE_FATIGUE: MILD
CARRYOUT_PHYSICAL_ACTIVITIES: COMPLETELY
RATE_QUALITY_OF_LIFE: EXCELLENT
RATE_SOCIAL_SATISFACTION: EXCELLENT
RATE_AVERAGE_PAIN: 3
EMOTIONAL_PROBLEMS: NEVER
RATE_PHYSICAL_HEALTH: GOOD
RATE_GENERAL_HEALTH: VERY GOOD

## 2025-05-13 ASSESSMENT — PAIN SCALES - GENERAL: PAINLEVEL_OUTOF10: 0-NO PAIN

## 2025-05-13 ASSESSMENT — ENCOUNTER SYMPTOMS
OCCASIONAL FEELINGS OF UNSTEADINESS: 0
LOSS OF SENSATION IN FEET: 0
DEPRESSION: 0

## 2025-05-13 NOTE — ASSESSMENT & PLAN NOTE
Stable, tolerating medications without side effect.  Continuing medications at current doses.  F/u 6-12 months.   Orders:    levothyroxine (Synthroid) 150 mcg tablet; Take 1 tablet (150 mcg) by mouth once daily in the morning. Take before breakfast. Please schedule appointment for further refills

## 2025-05-13 NOTE — PROGRESS NOTES
Subjective     History of Present Illness  Levi Phillips is a 55 year old male who presents with bilateral shoulder pain, worse on the left side.    He experiences bilateral shoulder pain, with the left shoulder being more affected than the right. The pain is particularly bothersome at night, disrupting his sleep. He describes himself as a side sleeper and uses a pillow to support his shoulders, which provides some relief. The left shoulder pain has started to occur during the day as well.    He denies any history of shoulder injury but attributes the pain to possible overuse. He experiences numbness and tingling in his entire arm at night, which he associates with certain sleeping positions. No neck or upper back pain is reported.    He has not tried any anti-inflammatory medications like Motrin or Aleve but has used extra strength Tylenol. He is currently taking Protonix regularly and also takes a daily vitamin D supplement.    He has not had any prior imaging or diagnostic studies for his shoulders. He notes that the pain is located in the bicep area and is associated with some rotator cuff discomfort.    His sleep is disrupted by shoulder pain, and his wife reports that he snores loudly but does not pause in his breathing. 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    He is a teacher and , recently retired and transitioning to a new school. He is moving to [facility], which is closer to his new workplace. He has a daughter who has completed her sophomore year of college.    Levi Phillips is a 55 y.o. male and is here for a comprehensive physical exam.     HTN:  BP controlled today  Taking medications as directed - no side effects noted.  Denies CP, SOB, Edema, palpitations or headache.     HYPOTHYROID: feeling well on current regimen    GERD : symptoms tolerable with Pantoprazole    SHOULDER PAIN: worse at night when he sleeps  Left now bothering during  th day    Do you take any herbs or supplements that were not prescribed by a doctor? Vit D  Are you taking aspirin daily? no    SOC Hx:   Tobacco Use: Medium Risk (5/13/2025)    Patient History     Smoking Tobacco Use: Never     Smokeless Tobacco Use: Former     Passive Exposure: Not on file     Alcohol Use: Not At Risk (10/21/2024)    AUDIT-C     Frequency of Alcohol Consumption: 2-3 times a week     Average Number of Drinks: 1 or 2     Frequency of Binge Drinking: Never     DIET: general    EXERCISE: irregularly    ELIMINATION: no constipation or diarrhea  COLON CA SCREENING: COLONOSCOPY 1/18/2019 REPEAT DUE 10 years    URINARY SX: none  PSA   Date Value Ref Range Status   01/28/2021 0.54 0.00 - 4.00 ng/mL Final     Comment:     The FDA requires that the method used for PSA assay be   reported to the physician. Values obtained with different   assay methods must not be used interchangeably. This test   was performed at [facility] using the [employer] PSA method, which is a sandwich immunoassay using   chemiluminescence for quantitation. The assay is approved  for measurement of prostate-specific antigen (PSA) in   serum and may be used in conjunction with a digital rectal  examination in men 50 years and older as an aid in   detection of prostate cancer.   5-Alpha-reductase inhibitors (e.g. Proscar, Finasteride,   Avodart, Dutasteride and Irena) for the treatment of BPH   have been shown to lower PSA levels by an average of 50%   after 6 months of treatment.       SLEEP: usually undesturbed    MOODS: stress manageable  Patient Health Questionnaire-9 Score: 1  LEYLA-7 Total Score: 0     Health Maintenance Due   Topic Date Due    HIV Screening  Never done    MMR Vaccine (1 of 1 - Standard series) Never done    Hepatitis C Screening  Never done    Screening for Diabetes  Never done    Hepatitis B Vaccine (1 of 3 - 19+ 3-dose series) Never done    Pneumococcal Vaccination (1 of 1 - PCV)  "Never done        Objective     VITALS:  /78 (BP Location: Left arm, Patient Position: Sitting, BP Cuff Size: Adult)   Pulse 77   Temp 36.7 °C (98 °F) (Temporal)   Ht 1.842 m (6' 0.5\")   Wt 122 kg (268 lb)   SpO2 96%   BMI 35.85 kg/m²      Physical Exam  Constitutional:       General: He is not in acute distress.     Appearance: Normal appearance.   HENT:      Head: Normocephalic and atraumatic.      Right Ear: Tympanic membrane, ear canal and external ear normal.      Left Ear: Tympanic membrane, ear canal and external ear normal.      Nose: Nose normal.      Mouth/Throat:      Mouth: Mucous membranes are moist.      Pharynx: No oropharyngeal exudate or posterior oropharyngeal erythema.   Eyes:      Extraocular Movements: Extraocular movements intact.      Conjunctiva/sclera: Conjunctivae normal.      Pupils: Pupils are equal, round, and reactive to light.   Cardiovascular:      Rate and Rhythm: Normal rate and regular rhythm.      Heart sounds: No murmur heard.  Pulmonary:      Effort: Pulmonary effort is normal.      Breath sounds: Normal breath sounds.   Abdominal:      General: Bowel sounds are normal.      Palpations: Abdomen is soft.   Musculoskeletal:         General: Normal range of motion.      Cervical back: No rigidity.   Lymphadenopathy:      Cervical: No cervical adenopathy.   Skin:     General: Skin is warm and dry.      Findings: No rash.   Neurological:      General: No focal deficit present.      Mental Status: He is alert and oriented to person, place, and time.      Cranial Nerves: No cranial nerve deficit.      Gait: Gait normal.   Psychiatric:         Mood and Affect: Mood normal.         Behavior: Behavior normal.         Assessment/Plan   Healthy male exam.      1. Please have labs drawn at your earliest convenience.  You should fast 12 hours prior to arriving at the lab - during these 12 hours you may have water, black coffee, black tea - nothing with cream or sugar and no solid " foods.    Our office will contact you with results after they have been reviewed.  Please allow 48 - 72 hours for most results, some may take longer.  Please keep in mind that results may post immediately to your online portal, even before we have a chance to review them.  Once we have had the opportunity to review we will post comments and have our staff contact you with results and any instructions.  Please call our office if you do not hear from our office in 7 days.     2. Patient Counseling:  --Nutrition: Stressed importance of moderation in sodium/caffeine intake, saturated fat and cholesterol, caloric balance, sufficient intake of fresh fruits, vegetables, fiber, calcium, iron, and 1 mg of folate supplement per day (for females capable of pregnancy).  --Exercise: Stressed the importance of regular exercise.   --Immunizations reviewed.  --Discussed benefits of screening colonoscopy(patients > 45 years of age)    3. Discussed the patient's BMI with him.  The BMI is above average. The patient received Current weight:    Weight change since last visit (-) denotes wt loss     Weight loss needed to achieve BMI 25:   Lbs  Weight loss needed to achieve BMI 30:   Lbs    Provided instructions on dietary changes  Provided instructions on exercise  Advised to Increase physical activity because they have an above normal BMI.    Assessment & Plan  Annual physical exam    Orders:    CBC; Future    Comprehensive Metabolic Panel; Future    Lipid Panel; Future    TSH with reflex to Free T4 if abnormal; Future    Primary hypertension  BP controlled, tolerating medications without side effect.  Continuing medications at current doses.  F/u 6 months.   Orders:    amLODIPine (Norvasc) 10 mg tablet; Take 1 tablet (10 mg) by mouth once daily. Please schedule appointment for further refills    valsartan-hydrochlorothiazide (Diovan-HCT) 320-25 mg tablet; Take 1 tablet by mouth once daily. Please schedule appointment for further  refills    Acquired hypothyroidism  Stable, tolerating medications without side effect.  Continuing medications at current doses.  F/u 6-12 months.   Orders:    levothyroxine (Synthroid) 150 mcg tablet; Take 1 tablet (150 mcg) by mouth once daily in the morning. Take before breakfast. Please schedule appointment for further refills    GERD without esophagitis  Controlled, tolerating medications without side effect.  Continuing medications at current doses.  F/u 6-12 months.    Orders:    pantoprazole (ProtoNix) 40 mg EC tablet; Take 1 tablet (40 mg) by mouth once daily. Please schedule appointment for further refills    Magnesium; Future    Screening for disorder of blood and blood-forming organs    Orders:    CBC; Future    Screening for cardiovascular condition    Orders:    Lipid Panel; Future    Screening for malignant neoplasm of prostate    Orders:    Prostate Specific Antigen, Screen; Future    Thyroid disorder screening    Orders:    TSH with reflex to Free T4 if abnormal; Future    Vitamin D deficiency    Orders:    Vitamin D 25-Hydroxy,Total (for eval of Vitamin D levels); Future    Acute pain of left shoulder  Acute flare treated with steroid course - baseline x-rays ordered.  Recommend PT if not improving and after 6 weeks conservative treatment referral for MRI or ortho consultation.  Orders:    XR shoulder left 2+ views; Future    celecoxib (CeleBREX) 200 mg capsule; Take 1 capsule (200 mg) by mouth once daily.    predniSONE (Deltasone) 20 mg tablet; Take 2 tablets (40 mg) by mouth once daily for 3 days, THEN 1 tablet (20 mg) once daily for 3 days.    Chronic left shoulder pain  Rotator cuff syndrome, left shoulder  Chronic left shoulder pain, more pronounced at night, likely due to overuse. Pain localized to the bicep tendon and rotator cuff area. Occasional nocturnal arm numbness suggests possible nerve impingement. No neck pain reported. Differential includes rotator cuff syndrome and bicep  tendonitis.  - Prescribe prednisone for 5-6 days to reduce inflammation.  - Prescribe Celebrex following prednisone for ongoing management.  - Recommend Tylenol for pain management as needed.  - Order x-ray of the left shoulder to assess for arthritis or other structural issues.  - Consider referral for physical therapy if symptoms persist.  - Discuss potential for corticosteroid injection if pain does not improve.  - Advise follow-up if symptoms do not improve or worsen.          Assessment & Plan  Recording duration: 10 minutes      Follow up in 6 months     Carli Paulino DO     This medical note was created with the assistance of artificial intelligence (AI) for documentation purposes. The content has been reviewed and confirmed by the healthcare provider for accuracy and completeness. Patient consented to the use of audio recording and use of AI during their visit.

## 2025-05-13 NOTE — ASSESSMENT & PLAN NOTE
Controlled, tolerating medications without side effect.  Continuing medications at current doses.  F/u 6-12 months.    Orders:    pantoprazole (ProtoNix) 40 mg EC tablet; Take 1 tablet (40 mg) by mouth once daily. Please schedule appointment for further refills    Magnesium; Future

## 2025-05-13 NOTE — ASSESSMENT & PLAN NOTE
BP controlled, tolerating medications without side effect.  Continuing medications at current doses.  F/u 6 months.   Orders:    amLODIPine (Norvasc) 10 mg tablet; Take 1 tablet (10 mg) by mouth once daily. Please schedule appointment for further refills    valsartan-hydrochlorothiazide (Diovan-HCT) 320-25 mg tablet; Take 1 tablet by mouth once daily. Please schedule appointment for further refills

## 2025-05-31 RX ORDER — LEVOTHYROXINE SODIUM 150 UG/1
150 TABLET ORAL EVERY MORNING
Qty: 90 TABLET | Refills: 1 | Status: SHIPPED | OUTPATIENT
Start: 2025-05-31